# Patient Record
Sex: FEMALE | Race: WHITE
[De-identification: names, ages, dates, MRNs, and addresses within clinical notes are randomized per-mention and may not be internally consistent; named-entity substitution may affect disease eponyms.]

---

## 2021-12-27 ENCOUNTER — HOSPITAL ENCOUNTER (OUTPATIENT)
Dept: HOSPITAL 76 - DI.S | Age: 81
Discharge: HOME | End: 2021-12-27
Attending: EMERGENCY MEDICINE
Payer: MEDICARE

## 2021-12-27 DIAGNOSIS — S52.501A: Primary | ICD-10-CM

## 2021-12-27 NOTE — XRAY REPORT
PROCEDURE:  Wrist 4 View RT

 

INDICATIONS: SPRAIN OF RIGHT WRIST

 

TECHNIQUE:  4 views of the wrist were acquired.  

 

COMPARISON:  None

 

FINDINGS:  

 

Bones: There is a minimally displaced slightly impacted fracture of the distal radius. No suspicious 
bony lesions.  

 

Scaphoid view:  No visualized fracture.

 

Soft tissues:  No suspicious soft tissue calcifications.  

 

IMPRESSION:  

Slightly impacted distal radial fracture without definitive intra-articular extension.

 

Reviewed by: Giselle Leger MD on 12/27/2021 2:44 PM PST

Approved by: Giselle Leger MD on 12/27/2021 2:44 PM PST

 

 

Station ID:  [id number]

## 2022-01-10 ENCOUNTER — HOSPITAL ENCOUNTER (OUTPATIENT)
Dept: HOSPITAL 76 - DI.WOS | Age: 82
Discharge: HOME | End: 2022-01-10
Attending: PHYSICIAN ASSISTANT
Payer: MEDICARE

## 2022-01-10 DIAGNOSIS — S52.571A: Primary | ICD-10-CM

## 2022-01-10 NOTE — XRAY REPORT
PROCEDURE:  Wrist 3 View RT

 

INDICATIONS: FRACTURE OF LOWER END OF RIGHT RADIUS

 

TECHNIQUE:  3 views of the wrist were acquired.  

 

COMPARISON:  12/27/2021

 

FINDINGS:  

 

Bones:. Noted is slightly impacted intra-articular distal radial fracture not significantly changed i
n appearance compared to prior study. No new fracture or dislocation. Wrist alignment is anatomic. No
 suspicious bony lesions.  

 

Scaphoid view:  Scaphoid is grossly intact.

 

Soft tissues:  No suspicious soft tissue calcifications.  

 

IMPRESSION:  

Nondisplaced impacted intra-articular fracture of distal radius unchanged in alignment from prior mandeep
dy. No new fracture.

 

Reviewed by: Ian Moser MD on 1/10/2022 4:14 PM PST

Approved by: Ian Moser MD on 1/10/2022 4:14 PM PST

 

 

Station ID:  [id number]

## 2022-02-01 ENCOUNTER — HOSPITAL ENCOUNTER (OUTPATIENT)
Dept: HOSPITAL 76 - DI.WOS | Age: 82
Discharge: HOME | End: 2022-02-01
Attending: PHYSICIAN ASSISTANT
Payer: MEDICARE

## 2022-02-01 DIAGNOSIS — S63.591A: Primary | ICD-10-CM

## 2022-02-01 DIAGNOSIS — S52.501D: ICD-10-CM

## 2022-02-01 NOTE — XRAY REPORT
PROCEDURE:  Wrist 3 View RT

 

INDICATIONS: F/U WRIST FX

 

TECHNIQUE:  3 views of the wrist were acquired.  

 

COMPARISON:  1/10/2022, 12/27/2021

 

FINDINGS:  

 

Bones: Healing fracture of the distal radius with sclerosis is seen. Degenerative changes of the carp
al bones, especially the triscaphe joint are unchanged.

 

Soft tissues:  No suspicious soft tissue calcifications.  

 

IMPRESSION:  

Nondisplaced impacted fracture of the distal radius, unchanged compared to the prior study.

 

Reviewed by: Arvin Cameron on 2/1/2022 12:03 PM PST

Approved by: Arvin Cameron on 2/1/2022 12:03 PM PST

 

 

Station ID:  [id number]

## 2022-02-22 ENCOUNTER — HOSPITAL ENCOUNTER (OUTPATIENT)
Dept: HOSPITAL 76 - DI.WOS | Age: 82
Discharge: HOME | End: 2022-02-22
Attending: PHYSICIAN ASSISTANT
Payer: MEDICARE

## 2022-02-22 DIAGNOSIS — S52.571D: Primary | ICD-10-CM

## 2022-02-22 NOTE — XRAY REPORT
PROCEDURE:  Wrist 3 View RT

 

INDICATIONS: WRIST FRACTURE

 

TECHNIQUE:  3 views of the wrist were acquired.  

 

COMPARISON:  February 1, 2022

 

 

FINDINGS:

BONES: Slightly less prominent sclerosis of the distal radius, which may reflect ongoing healing. No 
distinct fracture line is appreciated. The carpal bones are normally aligned.

Background osteophytic change noted.

 

SOFT TISSUES: No focal abnormality.

 

IMPRESSION:  

1.Less prominent sclerosis of the distal radius, which may reflect ongoing healing.

 

   

 

 

Reviewed by: Edouard Pinon MD on 2/22/2022 4:52 PM PST

Approved by: Edouard Pinon MD on 2/22/2022 4:52 PM PST

 

 

Station ID:  [id number] Statement Selected